# Patient Record
Sex: MALE | Race: WHITE
[De-identification: names, ages, dates, MRNs, and addresses within clinical notes are randomized per-mention and may not be internally consistent; named-entity substitution may affect disease eponyms.]

---

## 2021-01-11 ENCOUNTER — HOSPITAL ENCOUNTER (EMERGENCY)
Dept: HOSPITAL 8 - ED | Age: 51
Discharge: HOME | End: 2021-01-11
Payer: COMMERCIAL

## 2021-01-11 VITALS — SYSTOLIC BLOOD PRESSURE: 147 MMHG | DIASTOLIC BLOOD PRESSURE: 84 MMHG

## 2021-01-11 VITALS — WEIGHT: 315 LBS | HEIGHT: 73 IN | BODY MASS INDEX: 41.75 KG/M2
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
## 2021-01-11 DIAGNOSIS — I25.10: ICD-10-CM
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
## 2021-01-11 DIAGNOSIS — S42.402A: Primary | ICD-10-CM

## 2021-01-11 DIAGNOSIS — I10: ICD-10-CM

## 2021-01-11 DIAGNOSIS — F17.200: ICD-10-CM

## 2021-01-11 DIAGNOSIS — E11.9: ICD-10-CM
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
## 2021-01-11 PROCEDURE — 29105 APPLICATION LONG ARM SPLINT: CPT

## 2021-01-11 PROCEDURE — 73200 CT UPPER EXTREMITY W/O DYE: CPT

## 2021-01-11 PROCEDURE — 96375 TX/PRO/DX INJ NEW DRUG ADDON: CPT

## 2021-01-11 PROCEDURE — 73080 X-RAY EXAM OF ELBOW: CPT

## 2021-01-11 PROCEDURE — 99284 EMERGENCY DEPT VISIT MOD MDM: CPT

## 2021-01-11 PROCEDURE — 96374 THER/PROPH/DIAG INJ IV PUSH: CPT

## 2021-01-11 NOTE — NUR
RECEIVED BS REPORT FROM ZACHERY HENDRIX TO ASSUME CARE OF PT. AT THIS TIME.  PT. 
REPORTS PAIN IS "MUCH BETTER" AFTER MEDS.  TECH AT BS FOR SPLINT PLACEMENT.  
PT. TO D/C WHEN SPLINT COMPLETED.

## 2021-01-11 NOTE — NUR
Pt takes unknown blood thinner for 3 stents in his heart. "My veins were like 
garden hoses, I didn't have a heart attack."

## 2021-01-11 NOTE — NUR
Pt states he was sent from urgent care because he has small bone fragments 
"floating around in his elbow." CMS inctact. Pt presents in sling. NPO since 
1445. 1000mg of Tylenol for pain. Connected to BP and O2 monitors. Call light 
and cell phone in reach.

## 2021-01-14 ENCOUNTER — HOSPITAL ENCOUNTER (OUTPATIENT)
Dept: HOSPITAL 8 - OUT | Age: 51
Discharge: HOME | End: 2021-01-14
Attending: ORTHOPAEDIC SURGERY
Payer: COMMERCIAL

## 2021-01-14 VITALS — HEIGHT: 73 IN | WEIGHT: 315 LBS | BODY MASS INDEX: 41.75 KG/M2

## 2021-01-14 VITALS — DIASTOLIC BLOOD PRESSURE: 90 MMHG | SYSTOLIC BLOOD PRESSURE: 145 MMHG

## 2021-01-14 DIAGNOSIS — E66.01: ICD-10-CM
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
## 2021-01-14 DIAGNOSIS — Z79.82: ICD-10-CM

## 2021-01-14 DIAGNOSIS — F12.90: ICD-10-CM

## 2021-01-14 DIAGNOSIS — I10: ICD-10-CM

## 2021-01-14 DIAGNOSIS — Z72.89: ICD-10-CM

## 2021-01-14 DIAGNOSIS — Z83.3: ICD-10-CM

## 2021-01-14 DIAGNOSIS — Z98.890: ICD-10-CM

## 2021-01-14 DIAGNOSIS — I25.10: ICD-10-CM

## 2021-01-14 DIAGNOSIS — E78.5: ICD-10-CM

## 2021-01-14 DIAGNOSIS — S42.492A: Primary | ICD-10-CM
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
## 2021-01-14 DIAGNOSIS — Z20.828: ICD-10-CM
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
## 2021-01-14 LAB
ALBUMIN SERPL-MCNC: 3.5 G/DL (ref 3.4–5)
ALP SERPL-CCNC: 94 U/L (ref 45–117)
ALT SERPL-CCNC: 34 U/L (ref 12–78)
ANION GAP SERPL CALC-SCNC: 6 MMOL/L (ref 5–15)
BILIRUB SERPL-MCNC: 0.5 MG/DL (ref 0.2–1)
CALCIUM SERPL-MCNC: 8.8 MG/DL (ref 8.5–10.1)
CHLORIDE SERPL-SCNC: 107 MMOL/L (ref 98–107)
CREAT SERPL-MCNC: 0.93 MG/DL (ref 0.7–1.3)
PROT SERPL-MCNC: 7.1 G/DL (ref 6.4–8.2)

## 2021-01-14 PROCEDURE — 36415 COLL VENOUS BLD VENIPUNCTURE: CPT

## 2021-01-14 PROCEDURE — 73060 X-RAY EXAM OF HUMERUS: CPT

## 2021-01-14 PROCEDURE — 24579 OPTX HUMRL CNDYLR FRACTURE: CPT

## 2021-01-14 PROCEDURE — 80053 COMPREHEN METABOLIC PANEL: CPT

## 2021-01-14 PROCEDURE — 64415 NJX AA&/STRD BRCH PLXS IMG: CPT

## 2021-01-14 PROCEDURE — 76000 FLUOROSCOPY <1 HR PHYS/QHP: CPT

## 2021-01-14 PROCEDURE — C1713 ANCHOR/SCREW BN/BN,TIS/BN: HCPCS

## 2021-01-14 PROCEDURE — 87635 SARS-COV-2 COVID-19 AMP PRB: CPT

## 2021-01-14 PROCEDURE — 93005 ELECTROCARDIOGRAM TRACING: CPT
